# Patient Record
Sex: FEMALE | Race: WHITE | NOT HISPANIC OR LATINO | Employment: UNEMPLOYED | URBAN - METROPOLITAN AREA
[De-identification: names, ages, dates, MRNs, and addresses within clinical notes are randomized per-mention and may not be internally consistent; named-entity substitution may affect disease eponyms.]

---

## 2017-01-18 ENCOUNTER — COMMUNICATION - HEALTHEAST (OUTPATIENT)
Dept: FAMILY MEDICINE | Facility: CLINIC | Age: 52
End: 2017-01-18

## 2017-01-18 DIAGNOSIS — F32.A DEPRESSION: ICD-10-CM

## 2017-05-18 ENCOUNTER — COMMUNICATION - HEALTHEAST (OUTPATIENT)
Dept: FAMILY MEDICINE | Facility: CLINIC | Age: 52
End: 2017-05-18

## 2017-05-18 DIAGNOSIS — F32.A DEPRESSION: ICD-10-CM

## 2017-09-06 ENCOUNTER — COMMUNICATION - HEALTHEAST (OUTPATIENT)
Dept: FAMILY MEDICINE | Facility: CLINIC | Age: 52
End: 2017-09-06

## 2017-09-06 DIAGNOSIS — F32.A DEPRESSION: ICD-10-CM

## 2017-09-12 ENCOUNTER — OFFICE VISIT - HEALTHEAST (OUTPATIENT)
Dept: FAMILY MEDICINE | Facility: CLINIC | Age: 52
End: 2017-09-12

## 2017-09-12 DIAGNOSIS — F32.A DEPRESSION, UNSPECIFIED DEPRESSION TYPE: ICD-10-CM

## 2017-11-21 ENCOUNTER — COMMUNICATION - HEALTHEAST (OUTPATIENT)
Dept: FAMILY MEDICINE | Facility: CLINIC | Age: 52
End: 2017-11-21

## 2017-12-22 ENCOUNTER — COMMUNICATION - HEALTHEAST (OUTPATIENT)
Dept: TELEHEALTH | Facility: CLINIC | Age: 52
End: 2017-12-22

## 2017-12-22 ENCOUNTER — OFFICE VISIT - HEALTHEAST (OUTPATIENT)
Dept: FAMILY MEDICINE | Facility: CLINIC | Age: 52
End: 2017-12-22

## 2017-12-22 DIAGNOSIS — Z78.0 MENOPAUSE: ICD-10-CM

## 2017-12-22 DIAGNOSIS — Z13.220 SCREENING FOR HYPERLIPIDEMIA: ICD-10-CM

## 2017-12-22 DIAGNOSIS — Z12.4 ROUTINE CERVICAL SMEAR: ICD-10-CM

## 2017-12-22 DIAGNOSIS — Z12.31 VISIT FOR SCREENING MAMMOGRAM: ICD-10-CM

## 2017-12-22 DIAGNOSIS — Z13.228 SCREENING FOR METABOLIC DISORDER: ICD-10-CM

## 2017-12-22 DIAGNOSIS — Z13.0 SCREENING FOR DEFICIENCY ANEMIA: ICD-10-CM

## 2017-12-22 DIAGNOSIS — Z00.00 ROUTINE GENERAL MEDICAL EXAMINATION AT A HEALTH CARE FACILITY: ICD-10-CM

## 2017-12-22 LAB
CHOLEST SERPL-MCNC: 183 MG/DL
FASTING STATUS PATIENT QL REPORTED: YES
HDLC SERPL-MCNC: 70 MG/DL
LDLC SERPL CALC-MCNC: 102 MG/DL
TRIGL SERPL-MCNC: 55 MG/DL

## 2017-12-27 LAB
HUMAN PAPILLOMA VIRUS 16 DNA: NEGATIVE
HUMAN PAPILLOMA VIRUS 18 DNA: NEGATIVE
HUMAN PAPILLOMA VIRUS FINAL DIAGNOSIS: NORMAL
HUMAN PAPILLOMA VIRUS OTHER HR: NEGATIVE
SPECIMEN DESCRIPTION: NORMAL

## 2018-01-02 LAB
BKR LAB AP ABNORMAL BLEEDING: NO
BKR LAB AP BIRTH CONTROL/HORMONES: NORMAL
BKR LAB AP CERVICAL APPEARANCE: NORMAL
BKR LAB AP GYN ADEQUACY: NORMAL
BKR LAB AP GYN INTERPRETATION: NORMAL
BKR LAB AP HPV REFLEX: NORMAL
BKR LAB AP LMP: NORMAL
BKR LAB AP PATIENT STATUS: NORMAL
BKR LAB AP PREVIOUS ABNORMAL: NO
BKR LAB AP PREVIOUS NORMAL: 2013
HIGH RISK?: NO
PATH REPORT.COMMENTS IMP SPEC: NORMAL
RESULT FLAG (HE HISTORICAL CONVERSION): NORMAL

## 2018-01-03 ENCOUNTER — RECORDS - HEALTHEAST (OUTPATIENT)
Dept: BONE DENSITY | Facility: CLINIC | Age: 53
End: 2018-01-03

## 2018-01-03 ENCOUNTER — RECORDS - HEALTHEAST (OUTPATIENT)
Dept: ADMINISTRATIVE | Facility: OTHER | Age: 53
End: 2018-01-03

## 2018-01-03 ENCOUNTER — HOSPITAL ENCOUNTER (OUTPATIENT)
Dept: MAMMOGRAPHY | Facility: HOSPITAL | Age: 53
Discharge: HOME | End: 2018-01-03
Attending: FAMILY MEDICINE

## 2018-01-03 ENCOUNTER — COMMUNICATION - HEALTHEAST (OUTPATIENT)
Dept: FAMILY MEDICINE | Facility: CLINIC | Age: 53
End: 2018-01-03

## 2018-01-03 ENCOUNTER — COMMUNICATION - HEALTHEAST (OUTPATIENT)
Dept: TELEHEALTH | Facility: CLINIC | Age: 53
End: 2018-01-03

## 2018-01-03 DIAGNOSIS — Z12.31 VISIT FOR SCREENING MAMMOGRAM: ICD-10-CM

## 2018-01-03 DIAGNOSIS — Z78.0 ASYMPTOMATIC MENOPAUSAL STATE: ICD-10-CM

## 2018-01-05 ENCOUNTER — COMMUNICATION - HEALTHEAST (OUTPATIENT)
Dept: FAMILY MEDICINE | Facility: CLINIC | Age: 53
End: 2018-01-05

## 2018-01-05 ENCOUNTER — AMBULATORY - HEALTHEAST (OUTPATIENT)
Dept: FAMILY MEDICINE | Facility: CLINIC | Age: 53
End: 2018-01-05

## 2018-01-10 ENCOUNTER — COMMUNICATION - HEALTHEAST (OUTPATIENT)
Dept: FAMILY MEDICINE | Facility: CLINIC | Age: 53
End: 2018-01-10

## 2018-04-13 ENCOUNTER — RECORDS - HEALTHEAST (OUTPATIENT)
Dept: ADMINISTRATIVE | Facility: OTHER | Age: 53
End: 2018-04-13

## 2018-10-16 ENCOUNTER — COMMUNICATION - HEALTHEAST (OUTPATIENT)
Dept: FAMILY MEDICINE | Facility: CLINIC | Age: 53
End: 2018-10-16

## 2018-11-08 ENCOUNTER — COMMUNICATION - HEALTHEAST (OUTPATIENT)
Dept: FAMILY MEDICINE | Facility: CLINIC | Age: 53
End: 2018-11-08

## 2018-11-08 DIAGNOSIS — F32.A DEPRESSION, UNSPECIFIED DEPRESSION TYPE: ICD-10-CM

## 2018-11-29 ENCOUNTER — OFFICE VISIT - HEALTHEAST (OUTPATIENT)
Dept: FAMILY MEDICINE | Facility: CLINIC | Age: 53
End: 2018-11-29

## 2018-11-29 DIAGNOSIS — M72.0 DUPUYTREN'S CONTRACTURE OF LEFT HAND: ICD-10-CM

## 2018-11-29 DIAGNOSIS — F33.41 RECURRENT MAJOR DEPRESSIVE DISORDER, IN PARTIAL REMISSION (H): ICD-10-CM

## 2018-11-29 DIAGNOSIS — S46.212A BICEPS MUSCLE STRAIN, LEFT, INITIAL ENCOUNTER: ICD-10-CM

## 2019-01-08 ENCOUNTER — OFFICE VISIT - HEALTHEAST (OUTPATIENT)
Dept: FAMILY MEDICINE | Facility: CLINIC | Age: 54
End: 2019-01-08

## 2019-01-08 DIAGNOSIS — Z13.220 SCREENING FOR CHOLESTEROL LEVEL: ICD-10-CM

## 2019-01-08 DIAGNOSIS — Z13.0 SCREENING FOR DEFICIENCY ANEMIA: ICD-10-CM

## 2019-01-08 DIAGNOSIS — M25.562 CHRONIC PAIN OF LEFT KNEE: ICD-10-CM

## 2019-01-08 DIAGNOSIS — Z13.29 SCREENING FOR THYROID DISORDER: ICD-10-CM

## 2019-01-08 DIAGNOSIS — Z00.00 ROUTINE GENERAL MEDICAL EXAMINATION AT A HEALTH CARE FACILITY: ICD-10-CM

## 2019-01-08 DIAGNOSIS — Z11.59 ENCOUNTER FOR HEPATITIS C SCREENING TEST FOR LOW RISK PATIENT: ICD-10-CM

## 2019-01-08 DIAGNOSIS — R07.0 THROAT DISCOMFORT: ICD-10-CM

## 2019-01-08 DIAGNOSIS — Z13.88 SCREENING FOR LEAD EXPOSURE: ICD-10-CM

## 2019-01-08 DIAGNOSIS — G89.29 CHRONIC PAIN OF LEFT KNEE: ICD-10-CM

## 2019-01-08 DIAGNOSIS — Z13.1 SCREENING FOR DIABETES MELLITUS: ICD-10-CM

## 2019-01-08 DIAGNOSIS — Z13.21 ENCOUNTER FOR VITAMIN DEFICIENCY SCREENING: ICD-10-CM

## 2019-01-08 LAB
ANION GAP SERPL CALCULATED.3IONS-SCNC: 12 MMOL/L (ref 5–18)
BUN SERPL-MCNC: 14 MG/DL (ref 8–22)
CALCIUM SERPL-MCNC: 9.4 MG/DL (ref 8.5–10.5)
CHLORIDE BLD-SCNC: 104 MMOL/L (ref 98–107)
CHOLEST SERPL-MCNC: 170 MG/DL
CO2 SERPL-SCNC: 24 MMOL/L (ref 22–31)
CREAT SERPL-MCNC: 0.75 MG/DL (ref 0.6–1.1)
ERYTHROCYTE [DISTWIDTH] IN BLOOD BY AUTOMATED COUNT: 11.1 % (ref 11–14.5)
FASTING STATUS PATIENT QL REPORTED: NORMAL
GFR SERPL CREATININE-BSD FRML MDRD: >60 ML/MIN/1.73M2
GLUCOSE BLD-MCNC: 83 MG/DL (ref 70–125)
HCT VFR BLD AUTO: 38.8 % (ref 35–47)
HDLC SERPL-MCNC: 72 MG/DL
HGB BLD-MCNC: 12.9 G/DL (ref 12–16)
LDLC SERPL CALC-MCNC: 83 MG/DL
MCH RBC QN AUTO: 29.9 PG (ref 27–34)
MCHC RBC AUTO-ENTMCNC: 33.3 G/DL (ref 32–36)
MCV RBC AUTO: 90 FL (ref 80–100)
PLATELET # BLD AUTO: 147 THOU/UL (ref 140–440)
PMV BLD AUTO: 7.6 FL (ref 7–10)
POTASSIUM BLD-SCNC: 3.9 MMOL/L (ref 3.5–5)
RBC # BLD AUTO: 4.33 MILL/UL (ref 3.8–5.4)
SODIUM SERPL-SCNC: 140 MMOL/L (ref 136–145)
TRIGL SERPL-MCNC: 74 MG/DL
TSH SERPL DL<=0.005 MIU/L-ACNC: 1.78 UIU/ML (ref 0.3–5)
WBC: 5.5 THOU/UL (ref 4–11)

## 2019-01-08 ASSESSMENT — MIFFLIN-ST. JEOR: SCORE: 1186.89

## 2019-01-09 LAB
25(OH)D3 SERPL-MCNC: 17.2 NG/ML (ref 30–80)
COLLECTION METHOD: NORMAL
HCV AB SERPL QL IA: NEGATIVE
LEAD BLD-MCNC: NORMAL UG/DL
LEAD RETEST: NO

## 2019-01-11 LAB — LEAD BLDV-MCNC: <2 UG/DL (ref 0–4.9)

## 2019-01-14 ENCOUNTER — COMMUNICATION - HEALTHEAST (OUTPATIENT)
Dept: FAMILY MEDICINE | Facility: CLINIC | Age: 54
End: 2019-01-14

## 2019-03-21 ENCOUNTER — RECORDS - HEALTHEAST (OUTPATIENT)
Dept: MAMMOGRAPHY | Facility: CLINIC | Age: 54
End: 2019-03-21

## 2019-03-21 DIAGNOSIS — Z12.31 ENCOUNTER FOR SCREENING MAMMOGRAM FOR MALIGNANT NEOPLASM OF BREAST: ICD-10-CM

## 2019-05-17 ENCOUNTER — OFFICE VISIT - HEALTHEAST (OUTPATIENT)
Dept: INTERNAL MEDICINE | Facility: CLINIC | Age: 54
End: 2019-05-17

## 2019-05-17 DIAGNOSIS — H60.11 CELLULITIS OF RIGHT EARLOBE: ICD-10-CM

## 2019-05-17 RX ORDER — LORATADINE 10 MG/1
10 TABLET ORAL PRN
Status: SHIPPED | COMMUNITY
Start: 2019-05-17

## 2019-05-17 ASSESSMENT — MIFFLIN-ST. JEOR: SCORE: 1214.11

## 2019-12-11 ENCOUNTER — COMMUNICATION - HEALTHEAST (OUTPATIENT)
Dept: FAMILY MEDICINE | Facility: CLINIC | Age: 54
End: 2019-12-11

## 2019-12-11 DIAGNOSIS — F33.41 RECURRENT MAJOR DEPRESSIVE DISORDER, IN PARTIAL REMISSION (H): ICD-10-CM

## 2020-03-14 ENCOUNTER — COMMUNICATION - HEALTHEAST (OUTPATIENT)
Dept: FAMILY MEDICINE | Facility: CLINIC | Age: 55
End: 2020-03-14

## 2020-03-14 DIAGNOSIS — F33.41 RECURRENT MAJOR DEPRESSIVE DISORDER, IN PARTIAL REMISSION (H): ICD-10-CM

## 2020-03-25 ENCOUNTER — OFFICE VISIT - HEALTHEAST (OUTPATIENT)
Dept: FAMILY MEDICINE | Facility: CLINIC | Age: 55
End: 2020-03-25

## 2020-03-25 DIAGNOSIS — F33.41 RECURRENT MAJOR DEPRESSIVE DISORDER, IN PARTIAL REMISSION (H): ICD-10-CM

## 2020-03-25 DIAGNOSIS — Z12.31 VISIT FOR SCREENING MAMMOGRAM: ICD-10-CM

## 2020-03-25 ASSESSMENT — ANXIETY QUESTIONNAIRES
2. NOT BEING ABLE TO STOP OR CONTROL WORRYING: SEVERAL DAYS
1. FEELING NERVOUS, ANXIOUS, OR ON EDGE: SEVERAL DAYS
6. BECOMING EASILY ANNOYED OR IRRITABLE: SEVERAL DAYS
5. BEING SO RESTLESS THAT IT IS HARD TO SIT STILL: NOT AT ALL
3. WORRYING TOO MUCH ABOUT DIFFERENT THINGS: NOT AT ALL
4. TROUBLE RELAXING: NOT AT ALL
GAD7 TOTAL SCORE: 3
7. FEELING AFRAID AS IF SOMETHING AWFUL MIGHT HAPPEN: NOT AT ALL
IF YOU CHECKED OFF ANY PROBLEMS ON THIS QUESTIONNAIRE, HOW DIFFICULT HAVE THESE PROBLEMS MADE IT FOR YOU TO DO YOUR WORK, TAKE CARE OF THINGS AT HOME, OR GET ALONG WITH OTHER PEOPLE: SOMEWHAT DIFFICULT

## 2020-03-25 ASSESSMENT — PATIENT HEALTH QUESTIONNAIRE - PHQ9: SUM OF ALL RESPONSES TO PHQ QUESTIONS 1-9: 6

## 2020-07-28 ENCOUNTER — HOSPITAL ENCOUNTER (OUTPATIENT)
Dept: MAMMOGRAPHY | Facility: CLINIC | Age: 55
Discharge: HOME | End: 2020-07-28
Attending: NURSE PRACTITIONER

## 2020-07-28 DIAGNOSIS — Z12.31 VISIT FOR SCREENING MAMMOGRAM: ICD-10-CM

## 2020-09-24 ENCOUNTER — OFFICE VISIT - HEALTHEAST (OUTPATIENT)
Dept: FAMILY MEDICINE | Facility: CLINIC | Age: 55
End: 2020-09-24

## 2020-09-24 DIAGNOSIS — H61.21 IMPACTED CERUMEN OF RIGHT EAR: ICD-10-CM

## 2020-09-24 DIAGNOSIS — F33.41 RECURRENT MAJOR DEPRESSIVE DISORDER, IN PARTIAL REMISSION (H): ICD-10-CM

## 2020-09-24 DIAGNOSIS — Z23 NEED FOR IMMUNIZATION AGAINST INFLUENZA: ICD-10-CM

## 2020-09-24 ASSESSMENT — MIFFLIN-ST. JEOR: SCORE: 1199.36

## 2020-09-24 ASSESSMENT — PATIENT HEALTH QUESTIONNAIRE - PHQ9: SUM OF ALL RESPONSES TO PHQ QUESTIONS 1-9: 9

## 2020-10-20 ENCOUNTER — RECORDS - HEALTHEAST (OUTPATIENT)
Dept: ADMINISTRATIVE | Facility: OTHER | Age: 55
End: 2020-10-20

## 2021-03-01 ENCOUNTER — COMMUNICATION - HEALTHEAST (OUTPATIENT)
Dept: INTERNAL MEDICINE | Facility: CLINIC | Age: 56
End: 2021-03-01

## 2021-03-01 DIAGNOSIS — F33.41 RECURRENT MAJOR DEPRESSIVE DISORDER, IN PARTIAL REMISSION (H): ICD-10-CM

## 2021-03-09 ENCOUNTER — COMMUNICATION - HEALTHEAST (OUTPATIENT)
Dept: INTERNAL MEDICINE | Facility: CLINIC | Age: 56
End: 2021-03-09

## 2021-03-09 DIAGNOSIS — Z00.00 ROUTINE HEALTH MAINTENANCE: ICD-10-CM

## 2021-03-10 ENCOUNTER — AMBULATORY - HEALTHEAST (OUTPATIENT)
Dept: NURSING | Facility: CLINIC | Age: 56
End: 2021-03-10

## 2021-03-10 DIAGNOSIS — Z00.00 ROUTINE HEALTH MAINTENANCE: ICD-10-CM

## 2021-05-25 ENCOUNTER — RECORDS - HEALTHEAST (OUTPATIENT)
Dept: ADMINISTRATIVE | Facility: CLINIC | Age: 56
End: 2021-05-25

## 2021-05-27 ASSESSMENT — PATIENT HEALTH QUESTIONNAIRE - PHQ9
SUM OF ALL RESPONSES TO PHQ QUESTIONS 1-9: 6
SUM OF ALL RESPONSES TO PHQ QUESTIONS 1-9: 9

## 2021-05-28 ENCOUNTER — RECORDS - HEALTHEAST (OUTPATIENT)
Dept: ADMINISTRATIVE | Facility: CLINIC | Age: 56
End: 2021-05-28

## 2021-05-28 ASSESSMENT — ANXIETY QUESTIONNAIRES: GAD7 TOTAL SCORE: 3

## 2021-05-28 NOTE — PROGRESS NOTES
"  Office Visit - Follow Up   Mame Phan   54 y.o. female    Date of Visit: 5/17/2019    Chief Complaint   Patient presents with     Ear Pain     Right ear lobe infected where pierced earring goes, sharp shooting pain behind ear, started ~4 days ago        Assessment and Plan   1. Cellulitis of right earlobe  She has had similar problems with this when she tries to put in earrings with a bit of difficulty.  Subsequently she develops a bit of infection.  The redness and slight tenderness of her earlobe is minimal at this time.  She clearly thinks it is improving.  Will not use antibiotics now but could call if it is worse and we will start something.          No follow-ups on file.     History of Present Illness   This 54 y.o. old comes in complaining that she has had a swollen and tender right earlobe.  Beginning 6 days ago after she had some difficulty getting an earring in developed some redness and swelling in her right earlobe.  It progressed to where 4 days ago it was quite hard and tender.  Seem to turn the corner and over the last few days is gotten substantially better.  She denies any fevers or chills.  She has been soaking her right earlobe Epson salts and a shot glass.  I told her this is quite created.    Review of Systems: A comprehensive review of systems was negative except as noted.     Medications, Allergies and Problem List   Reviewed, reconciled and updated     Physical Exam   General Appearance:       /66 (Patient Site: Right Arm, Patient Position: Sitting, Cuff Size: Adult Large)   Pulse 64   Ht 5' 6\" (1.676 m)   Wt 135 lb (61.2 kg)   SpO2 99%   BMI 21.79 kg/m      She has very mild redness in the right earlobe.  There is no palpable abscess.  Minimal tenderness.  No significant warmth.  There is no preauricular nor postauricular adenopathy.  No cervical chain adenopathy.     Additional Information   Current Outpatient Medications   Medication Sig Dispense Refill     loratadine " (CLARITIN) 10 mg tablet Take 10 mg by mouth as needed for allergies.       sertraline (ZOLOFT) 100 MG tablet TAKE 2 TABLETS(200 MG) BY MOUTH DAILY. 180 tablet 3     No current facility-administered medications for this visit.      Allergies   Allergen Reactions     Monistat 1 (Tioconazole) [Tioconazole]      Social History     Tobacco Use     Smoking status: Never Smoker     Smokeless tobacco: Never Used   Substance Use Topics     Alcohol use: Yes     Comment: 2/ week     Drug use: No       Review and/or order of clinical lab tests:  Review and/or order of radiology tests:  Review and/or order of medicine tests:  Discussion of test results with performing physician:  Decision to obtain old records and/or obtain history from someone other than the patient:  Review and summarization of old records and/or obtaining history from someone other than the patient and.or discussion of case with another health care provider:  Independent visualization of image, tracing or specimen itself:    Time: total time spent with the patient was 15 minutes of which >50% was spent in counseling and coordination of care     Syed Corbett MD

## 2021-05-29 ENCOUNTER — RECORDS - HEALTHEAST (OUTPATIENT)
Dept: ADMINISTRATIVE | Facility: CLINIC | Age: 56
End: 2021-05-29

## 2021-05-31 VITALS — WEIGHT: 124 LBS

## 2021-06-01 ENCOUNTER — RECORDS - HEALTHEAST (OUTPATIENT)
Dept: ADMINISTRATIVE | Facility: CLINIC | Age: 56
End: 2021-06-01

## 2021-06-02 VITALS — BODY MASS INDEX: 20.73 KG/M2 | HEIGHT: 66 IN | WEIGHT: 129 LBS

## 2021-06-02 VITALS — WEIGHT: 128.25 LBS

## 2021-06-03 VITALS — HEIGHT: 66 IN | BODY MASS INDEX: 21.69 KG/M2 | WEIGHT: 135 LBS

## 2021-06-04 NOTE — TELEPHONE ENCOUNTER
Refill Approved    Rx renewed per Medication Renewal Policy. Medication was last renewed on 11/29/*18.    Yenny Lara, [facility] Triage/Med Refill 12/14/2019     Requested Prescriptions   Pending Prescriptions Disp Refills     sertraline (ZOLOFT) 100 MG tablet [Pharmacy Med Name: SERTRALINE 100MG TABLETS] 180 tablet 0     Sig: TAKE 2 TABLETS(200 MG) BY MOUTH DAILY       SSRI Refill Protocol  Passed - 12/11/2019  5:15 PM        Passed - PCP or prescribing provider visit in last year     Last office visit with prescriber/PCP: 11/29/2018 Huong Dacosta CNP OR same dept: Visit date not found OR same specialty: 11/29/2018 Huong Dacosta CNP  Last physical: 1/8/2019 Last MTM visit: Visit date not found   Next visit within 3 mo: Visit date not found  Next physical within 3 mo: Visit date not found  Prescriber OR PCP: Huong Dacosta CNP  Last diagnosis associated with med order: 1. Recurrent major depressive disorder, in partial remission (H)  - sertraline (ZOLOFT) 100 MG tablet [Pharmacy Med Name: SERTRALINE 100MG TABLETS]; TAKE 2 TABLETS(200 MG) BY MOUTH DAILY.  Dispense: 180 tablet; Refill: 0    If protocol passes may refill for 12 months if within 3 months of last provider visit (or a total of 15 months).

## 2021-06-05 VITALS
WEIGHT: 131.75 LBS | RESPIRATION RATE: 18 BRPM | OXYGEN SATURATION: 97 % | HEART RATE: 65 BPM | HEIGHT: 66 IN | TEMPERATURE: 98.1 F | BODY MASS INDEX: 21.17 KG/M2 | DIASTOLIC BLOOD PRESSURE: 60 MMHG | SYSTOLIC BLOOD PRESSURE: 98 MMHG

## 2021-06-06 NOTE — TELEPHONE ENCOUNTER
I have refilled this. Please let her know she is due for med check but we can do a phone visit for this to avoid any contact in clinic

## 2021-06-07 NOTE — PROGRESS NOTES
"Mame Phan is a 55 y.o. female who is being evaluated via a billable telephone visit.      The patient has been notified of following:     \"This telephone visit will be conducted via a call between you and your physician/provider. We have found that certain health care needs can be provided without the need for a physical exam.  This service lets us provide the care you need with a short phone conversation.  If a prescription is necessary we can send it directly to your pharmacy.  If lab work is needed we can place an order for that and you can then stop by our lab to have the test done at a later time.    If during the course of the call the physician/provider feels a telephone visit is not appropriate, you will not be charged for this service.\"     Mame Phan calls today for medication check.   Daughters are in college and senior in high school. They have been enjoying time together at home baking and talking and just spending time.  G[place] together with neighbors [place] afternoon to talk, standing at least six feet apart of course.     She has a history of depression that has previously been well controlled on sertraline 150mg-200mg.  She has been on this medication for almost 20 years. Currently doing 150mg. Does bump up to 200mg when depression worsens.  She feels she has been coping relatively well with everything.  She still lives with her .  She is still hoping to get  in the near future but must secure a job before she can do that which has been difficult with the current economic environment.  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  Her  is working from home right now which has been difficult because they are forced to spend so much more time together.  She is still following with her therapist regularly. Walking 1-2 hours daily.  She believes depression has been relatively well controlled.      HM:  Due for " mammogram.  [facility] in [place] at age 52.     Chief Complaint   Patient presents with     Depression       I have reviewed and updated the patient's Past Medical History, Social History, Family History and Medication List.    ALLERGIES  Monistat 1 (tioconazole) [tioconazole]    Additional provider notes: none    Assessment/Plan:  1. Recurrent major depressive disorder, in partial remission (H)  Some recent stress with estranged  now working from home.  Continues to try to secure a job which has also been stressful.  However, overall she believes she is coping well and has been focusing on exercise and spending time with her girls.  Currently on sertraline 150mg though does increase to 200mg during flares of depression.  She feels this continues to work well for her.  Following regularly with therapist. Will plan for physical and med check in one year.  Sooner if depression worsens.   - sertraline (ZOLOFT) 100 MG tablet; Two tablets by mouth daily  Dispense: 180 tablet; Refill: 3    2. Visit for screening mammogram  Continue with annual mammograms at least until age 64 d/t [facility] in mother.  Advised to schedule this in July.    - Mammo Screening Bilateral; Future    Phone call duration: 11 minutes    Huong Dacosta, ELZA

## 2021-06-10 ENCOUNTER — COMMUNICATION - HEALTHEAST (OUTPATIENT)
Dept: FAMILY MEDICINE | Facility: CLINIC | Age: 56
End: 2021-06-10

## 2021-06-10 DIAGNOSIS — F33.41 RECURRENT MAJOR DEPRESSIVE DISORDER, IN PARTIAL REMISSION (H): ICD-10-CM

## 2021-06-13 RX ORDER — SERTRALINE HYDROCHLORIDE 100 MG/1
TABLET, FILM COATED ORAL
Qty: 180 TABLET | Refills: 0 | Status: SHIPPED | OUTPATIENT
Start: 2021-06-13 | End: 2021-09-08

## 2021-06-14 NOTE — PROGRESS NOTES
FEMALE ADULT PREVENTIVE EXAM    CHIEF COMPLAINT:  Female preventive exam.    SUBJECTIVE:  Mame Phan is a 52 y.o. female who presents for her routine physical exam.    Patient would like to address the following concerns today: She has no immediate one new concern she is feeling very well.      GYNE HISTORY  Menses: yes  Sexually Active:   yes  Contraception:   yes  Last Pap:  12/13 DUE FOR THIS   Abnormal Pap:  no  Vaginal Discharge:  no      She  has no past medical history on file.    Lab Results   Component Value Date    HGB 14.6 12/20/2013     12/20/2013    K 4.4 12/20/2013    BUN 16 12/20/2013     Lab Results   Component Value Date    CHOL 160 01/03/2012    HDL 56 01/03/2012    LDLCALC 92 01/03/2012    TRIG 60 01/03/2012     No results found for: TSH  BP Readings from Last 3 Encounters:   12/22/17 112/62   04/14/15 102/54       Surgeries:  No past surgical history on file.    Family History:  Her family history includes Breast cancer (age of onset: 53) in her mother; Cancer in her cousin, father, maternal grandmother, and paternal grandmother; No Medical Problems in her daughter, maternal aunt, maternal grandfather, paternal aunt, paternal grandfather, and sister. There is no history of BRCA 1/2, Colon cancer, Endometrial cancer, or Ovarian cancer.    Social History:  She  reports that she has never smoked. She has never used smokeless tobacco. She reports that she drinks alcohol. She reports that she does not use illicit drugs.    Medications:    Current Outpatient Prescriptions:      sertraline (ZOLOFT) 100 MG tablet, TAKE 2 TABLETS(200 MG) BY MOUTH DAILY, Disp: 180 tablet, Rfl: 3  HELD MEDICATIONS: None.    Allergies:  No latex allergies.  Allergies   Allergen Reactions     Monistat 1 (Tioconazole) [Tioconazole]             RISK BEHAVIOR & HEALTH HABITS  Self Breast Exam:  yes  Regular Exercise:  yes  Balanced diet:  yes  Seat Belt Use: yes  Calcium intake/Osteoporosis prevention:  Discussed  calcium and Vitamin D recommendations  Dexa:  no  Colonoscopy: 4/14  Mammogram:  4/15 DUE FOR THIS     Guns: NO  Sun Screen: YES  Dental Care: YES    REVIEW OF SYSTEMS:  Complete head to toe review of systems is otherwise negative except as above.    OBJECTIVE:  VITAL SIGNS:    Vitals:    12/22/17 1057   BP: 112/62     GENERAL:  Patient alert, in no acute distress.  EYES: PERRLA. Extraoccular movements intact, pupils equal, reactive to light and accommodation.  ENT:  Hearing grossly normal.  Normal appearance to ears and nose.  Bilateral TM s, external canals, oropharynx normal. Normal lips, gums and teeth.  Normal nasal mucosa, septum and turbinates.  NECK:  Supple, without thyromegaly or mass.  RESP:  Clear to auscultation without crackles, wheezes or distress.  Normal respiratory effort.   CV:  Regular rate and rhythm without murmurs, rubs or gallops.  Normal carotid, abdominal aorta, femoral and pedal pulses.  No varicosities or edema.  ABDOMEN:  Soft, non-tender, without hepatosplenomegaly, masses, or hernias.   BREASTS:  Nontender, without masses, nipple discharge, erythema, or axillary adenopathy.    :    External genitalia:  Normal without lesions.  Urethra: Normal appearing  Vagina: Normal without discharge  Cervix: Speculum exam - cervix visually normal.Vagina normal.Sample taken for Pap smear.Bimanual exam  - cervix palpably normal. No tenderness on cervical motion.   Uterus:  Nontender, mobile, without masses  Ovaries: Normal without masses  LYMPHATIC: Normal palpation of neck, groin and axilla..  No lymphadenopathy.  No bruising.  NEURO:  Non-focal and intact.  PSYCHIATRIC:  Alert & oriented with normal mood and affect.  Good judgment and insight.  SKIN:  Normal inspection and palpation.  MUSCULOSKELETAL: Normal gait and station.      ASSESSMENT & PLAN  Mame was seen today for annual exam.    Diagnoses and all orders for this visit:    Screening for deficiency anemia  -     HM1(CBC and  Differential)  -     HM1 (CBC with Diff)    Screening for hyperlipidemia  -     Lipid Cascade    Screening for metabolic disorder  -     Comprehensive Metabolic Panel    Visit for screening mammogram  -     Mammo Screening Bilateral; Future    Routine cervical smear  -     Gynecologic Cytology (PAP Smear)    Menopause  -     DXA Bone Density Scan; Future    Other orders  -     Td, Preservative Free (green label)      General  Immunizations reviewed and updated .  Recommended adequate calcium intake/osteoporosis prevention.  Discussed colon cancer screening at age 50, 45 if -American.  Diet and exercise reviewed, including goal of aerobic exercise 30-90 minutes most days

## 2021-06-15 NOTE — TELEPHONE ENCOUNTER
Refill Approved    Rx renewed per Medication Renewal Policy. Medication was last renewed on 03/25/2020.  Last office visit was 09/24/2020 with PCP.    Kimberlee Barber, [facility] Triage/Med Refill 3/2/2021     Requested Prescriptions   Pending Prescriptions Disp Refills     sertraline (ZOLOFT) 100 MG tablet 180 tablet 3     Sig: Two tablets by mouth daily       SSRI Refill Protocol  Passed - 3/1/2021  2:20 PM        Passed - PCP or prescribing provider visit in last year     Last office visit with prescriber/PCP: 9/24/2020 Huong Dacosta CNP OR same dept: Visit date not found OR same specialty: 5/17/2019 Syed Corbett MD  Last physical: 1/8/2019 Last MTM visit: Visit date not found   Next visit within 3 mo: Visit date not found  Next physical within 3 mo: Visit date not found  Prescriber OR PCP: Huong Dacosta CNP  Last diagnosis associated with med order: 1. Recurrent major depressive disorder, in partial remission (H)  - sertraline (ZOLOFT) 100 MG tablet; Two tablets by mouth daily  Dispense: 180 tablet; Refill: 3    If protocol passes may refill for 12 months if within 3 months of last provider visit (or a total of 15 months).

## 2021-06-15 NOTE — TELEPHONE ENCOUNTER
Reason for Call:  Other call back      Detailed comments: PATIENT CALLING STATING HAS A NEW PHARMACY:  SERTRALINE 100MG 2 TABS DAILY REQUESTING A 90 DAY SUPPLY    [employer] -- [phone] OPTION 2        Phone Number Patient can be reached at: Cell number on file:    Telephone Information:   Mobile [phone]       Best Time: ANYTIME    Can we leave a detailed message on this number?: Yes    Call taken on 3/1/2021 at 11:32 AM by Aisha Villanueva

## 2021-06-18 NOTE — LETTER
Letter by Huong Dacosta CNP at      Author: Huong Dacosta CNP Service: -- Author Type: --    Filed:  Encounter Date: 1/14/2019 Status: (Other)       Mame Phan  [address] [facility] [address]             January 14, 2019         Dear Ms. Phan,    Below are the results from your recent visit:    Resulted Orders   Lipid Cascade   Result Value Ref Range    Cholesterol 170 <=199 mg/dL    Triglycerides 74 <=149 mg/dL    HDL Cholesterol 72 >=50 mg/dL    LDL Calculated 83 <=129 mg/dL    Patient Fasting > 8hrs? Unknown    Basic Metabolic Panel   Result Value Ref Range    Sodium 140 136 - 145 mmol/L    Potassium 3.9 3.5 - 5.0 mmol/L    Chloride 104 98 - 107 mmol/L    CO2 24 22 - 31 mmol/L    Anion Gap, Calculation 12 5 - 18 mmol/L    Glucose 83 70 - 125 mg/dL    Calcium 9.4 8.5 - 10.5 mg/dL    BUN 14 8 - 22 mg/dL    Creatinine 0.75 0.60 - 1.10 mg/dL    GFR MDRD Af Amer >60 >60 mL/min/1.73m2    GFR MDRD Non Af Amer >60 >60 mL/min/1.73m2    Narrative    Fasting Glucose reference range is 70-99 mg/dL per  American Diabetes Association (ADA) guidelines.   Hepatitis C Antibody (Anti-HCV)   Result Value Ref Range    Hepatitis C Ab Negative Negative   HM2(CBC w/o Differential)   Result Value Ref Range    WBC 5.5 4.0 - 11.0 thou/uL    RBC 4.33 3.80 - 5.40 mill/uL    Hemoglobin 12.9 12.0 - 16.0 g/dL    Hematocrit 38.8 35.0 - 47.0 %    MCV 90 80 - 100 fL    MCH 29.9 27.0 - 34.0 pg    MCHC 33.3 32.0 - 36.0 g/dL    RDW 11.1 11.0 - 14.5 %    Platelets 147 140 - 440 thou/uL    MPV 7.6 7.0 - 10.0 fL   Vitamin D, Total (25-Hydroxy)   Result Value Ref Range    Vitamin D, Total (25-Hydroxy) 17.2 (L) 30.0 - 80.0 ng/mL    Narrative    Deficiency <10.0 ng/mL  Insufficiency 10.0-29.9 ng/mL  Sufficiency 30.0-80.0 ng/mL  Toxicity (possible) >100.0 ng/mL   Thyroid Cascade   Result Value Ref Range    TSH 1.78 0.30 - 5.00 uIU/mL   Lead, Blood, Venouos   Result Value Ref Range    Lead, Blood (Venous) <2.0 0.0 - 4.9 ug/dL      Comment:      " INTERPRETIVE INFORMATION: Lead, Blood (Venous)    Elevated results may be due to skin or collection-related   contamination, including the use of a noncertified lead-free tube.   If contamination concerns exist due to elevated levels of blood   lead, confirmation with a second specimen collected in a certified   lead-free tube is recommended.    Information sources for reference intervals and interpretive   comments include the \"CDC Response to the 2012 Advisory Committee   on Childhood Lead Poisoning Prevention Report\" and the   \"Recommendations for Medical Management of Adult Lead Exposure,   Environmental Health Perspectives, 2007.\" Thresholds and time   intervals for retesting, medical evaluation, and response vary by   state and regulatory body. Contact your State Department of Health   and/or applicable regulatory agency for specific guidance on   medical management recommendations.         Age            Concentration   Comment    All ages       5-9.9 ug/dL     Adverse health   effects are                                  possible, particularly in                                 children under 6 years of                                 age and pregnant women.                                 Discuss health risks                                 associated with continued                                 lead exposure. For children                                 and women who are or may                                 become pregnant, reduce                                 lead exposure.                 All ages        10-19.9 ug/dL  Reduced lead exposure and                                 increased biological                                 monitoring are recommended.    All ages        20-69.9 ug/dL  Removal from lead exposure                                 and prompt medical                                 evaluation are recommended.                                 Consider chelation therapy                 "                 when concentrations exceed                                   50 ug/dL and symptoms of                                 lead toxicity are present.    Less than 19     Greater than  Critical. Immediate medical  years of age     44.9 ug/dL    evaluation is recommended.                                 Consider chelation therapy                                  when symptoms of lead                                 toxicity are present.    Greater than 19  Greater than  Critical. Immediate medical  years of age     69.9 ug/dL    evaluation is recommended                                 Consider chelation therapy                                 when symptoms of lead                                  toxicity are present.    Test developed and characteristics determined by [facility]. See Compliance Statement B: [employer]  Performed by [facility],  [address] [place] [id number] [phone]  [email or web address], True Merchant MD, Lab. Director        All of your labs were normal except for your vitamin D which was low.  I recommend either starting a  supplement of 2000IU daily or increasing your current supplementation by 2000IU.     Please call with questions or contact us using [employer].    Sincerely,        Electronically signed by Houng Dacosta CNP

## 2021-06-20 NOTE — LETTER
Letter by Huong Dacosta CNP at      Author: Huong Dacosta CNP Service: -- Author Type: --    Filed:  Encounter Date: 3/25/2020 Status: (Other)                    My Depression Action Plan  Name: Mame Phan   Date of Birth 1965  Date: 3/25/2020    My Doctor: Huong Dacosta CNP   My Clinic: [facility]  [address] [address]  [phone]          GREEN    ZONE   Good Control    What it looks like:     Things are going generally well. You have normal ups and downs. You may even feel depressed from time to time, but bad moods usually last less than a day.   What you need to do:  1. Continue to care for yourself (see self care plan)  2. Check your depression survival kit and update it as needed  3. Follow your physicians recommendations including any medication.  4. Do not stop taking medication unless you consult with your physician first.           YELLOW         ZONE Getting Worse    What it looks like:     Depression is starting to interfere with your life.     It may be hard to get out of bed; you may be starting to isolate yourself from others.    Symptoms of depression are starting to last most all day and this has happened for several days.     You may have suicidal thoughts but they are not constant.   What you need to do:     1. Call your care team. Your response to treatment will improve if you keep your care team informed of your progress. Yellow periods are signs an adjustment may need to be made.     2. Continue your self-care.  Just get dressed and ready for the day.  Don't give yourself time to talk yourself out of it.    3. Talk to someone in your support network.    4. Open up your depression Depression Self-Care Plan / Wellness kit.           RED    ZONE Medical Alert - Get Help    What it looks like:     Depression is seriously interfering with your life.     You may experience these or other symptoms: You cant get out of bed most days, cant work or  engage in other necessary activities, you have trouble taking care of basic hygiene, or basic responsibilities, thoughts of suicide or death that will not go away, self-injurious behavior.     What you need to do:  1. Call your care team and request a same-day appointment. If they are not available (weekends or after hours) call your local crisis line, emergency room or 911.            Self-Care Plan / Wellness Kit    Self-Care for Depression  Heres the deal. Your body and mind are really not as separate as most people think.  What you do and think affects how you feel and how you feel influences what you do and think. This means if you do things that people who feel good do, it will help you feel better.  Sometimes this is all it takes.  There is also a place for medication and therapy depending on how severe your depression is, so be sure to consult with your medical provider and/ or Behavioral Health Consultant if your symptoms are worsening or not improving.     In order to better manage my stress, I will:    Exercise  Get some form of exercise, every day. This will help reduce pain and release endorphins, the feel good chemicals in your brain. This is almost as good as taking antidepressants!  This is not the same as joining a gym and then never going! (they count on that by the way?) It can be as simple as just going for a walk or doing some gardening, anything that will get you moving.      Hygiene   Maintain good hygiene (get out of bed in the morning, make your bed, brush your teeth, take a shower, and get dressed like you were going to work, even if you are unemployed).  If your clothes don't fit try to get ones that do.    Diet  Strive to eat foods that are good for me, drink plenty of water, and avoid excessive sugar, caffeine, alcohol, and other mood-altering substances.  Some foods that are helpful in depression are: complex carbohydrates, B vitamins, flaxseed, fish or fish oil, fresh fruits and  vegetables.    Psychotherapy  Agree to participate in Individual Therapy (if recommended).    Medication  If prescribed medications, I agree to take them.  Missing doses can result in serious side effects.  I understand that drinking alcohol, or other illicit drug use, may cause potential side effects.  I will not stop my medication abruptly without first discussing it with my provider.    Staying Connected With Others  Stay in touch with my friends, family members, and my primary care provider/team.    Use your imagination  Be creative.  We all have a creative side; it doesnt matter if its oil painting, sand castles, or mud pies! This will also kick up the endorphins.    Witness Beauty  (AKA stop and smell the roses) Take a look outside, even in mid-winter. Notice colors, textures. Watch the squirrels and birds.     Service to others  Be of service to others.  There is always someone else in need.  By helping others we can get out of ourselves and remember the really important things.  This also provides opportunities for practicing all the other parts of the program.    Humor  Laugh and be silly!  Adjust your TV habits for less news and crime-drama and more comedy.    Control your stress  Try breathing deep, massage therapy, biofeedback, and meditation. Find time to relax each day.     Crisis Text Line  http://www.crisistextline.org    The Crisis Text Line serves anyone, in any type of crisis, providing access to free, 24/7 support and information via the medium people already use and trust:    Here's how it works:  1.  Text 184-151 from anywhere in the [place], anytime, about any type of crisis.  2.  A live, trained Crisis Counselor receives the text and responds quickly.  3.  The volunteer Crisis Counselor will help you move from a 'hot moment to a cool moment'.  My support system    Clinic Contact:  Phone number:    Contact 1:  Phone number:    Contact 2:  Phone number:    Yarsani/:  Phone  number:    Therapist:  Phone number:    [facility] crisis center:    Phone number:    Other community support:  Phone number:

## 2021-06-22 NOTE — PATIENT INSTRUCTIONS - HE
Recommendations from today's visit                                                       1. Check with your insurance on coverage for Shingrix.  If this is covered, you may return for a nurse-only visit.      2. Left knee:  Normal exam today.  If this becomes more uncomfortable, please let me know and we would do some imaging at that point.      3. Throat: Continue to monitor. If still present after 2-3 months I would refer you to Ear, Nose and Throat to do an in-office scope    Next appointment: annual physical     To reschedule your appointment, please call the clinic directly at [phone].   It was a pleasure seeing you today! I look forward to seeing you again.

## 2021-06-22 NOTE — PROGRESS NOTES
Assessment & Plan   1. Recurrent major depressive disorder, in partial remission (H)  Depression fairly well controlled on sertraline 150-200mg.  Significant situational stressors currently with upcoming separation from her  and daughter transitioning to college.  Encouraged her to continue to work with her therapist. She will return if depression symptoms worsen.  Medication refilled for one year.    - sertraline (ZOLOFT) 100 MG tablet; TAKE 2 TABLETS(200 MG) BY MOUTH DAILY.  Dispense: 180 tablet; Refill: 3    2. Dupuytren's contracture of left hand  Discussed diagnosis and potential for progression with age, affecting the fingers.  Currently she is not particularly bothered by this so will continue to monitor.     3. Biceps muscle strain, left, initial encounter  I suspect this is a biceps strain related to carrying heavy bags with the left forearm.  She will be very conscientious of this over the next couple of weeks and she does have a physical scheduled in a few weeks when we can recheck. Consider lab workup for myalgia and possible xray if no improvement.     Huong Dacosta CNP    Subjective   Chief Complaint:  Depression    HPI:   Mame Phan is a 53 y.o. female who presents for establish care and medication check.   She is a previous patient of Dr. Bates.  PMH of depression, otherwise negative.      Depression:  H/o depression for almost 20 years, first noted in postpartum period with her oldest daughter. She has been on sertraline since that time at various dosages.  She is currently taking 200mg.  Alternates between 150mg and 200mg depending upon the time of year and situation.  She describes a history of a very difficult marriage.  States she strongly believes depression during this time is directly related to her .  Put off a divorce as her daughter was going through a difficult time and wanted a stable home life for her. This past spring she decided to pursue this. Currently still  living in the same house, however she plans to move out in January.  Needing to find a job for financial independence. Big source of anxiety for her.  Working with a therapist regularly for the past few years.  Daughter with mental health concerns as well who recently transitioned to college.  Overall, she feels the sertraline works well for her.  She feels her current depression symptoms, most notably sleeping long hours, is related to her situation.      L hand:  Painful lump on left hand for many years.  Full ROM of fingers without locking.  She believes this is related to a difficult doorknob in the house which requires her to strain her hand to use.      L arm:  Fairly constant muscle ache in the left biceps area for the past few months.  She states initially this felt 'deeper, like in the bone'.  However, she realized she was carrying her purse on her forearm with her elbow flexed.  Now wondering if it is more muscular.  Mild pain on right side, predominantly left.  No discomfort in elbow or shoulder joints.     Allergies:  is allergic to monistat 1 (tioconazole) [tioconazole].    SH/FH:  Social History and Family History reviewed and updated.   Tobacco Status:  She  reports that  has never smoked. she has never used smokeless tobacco.    Review of Systems:  A complete head to toe ROS is negative unless otherwise noted in HPI    Objective   There were no vitals filed for this visit.    Physical Exam:  GENERAL: Alert, well-appearing female  PSYCH: Pleasant mood, affect appropriate.  Good judgment and insight.  Intact recent and remote memory.  Good eye contact.  MSK: Left palm with semi-firm nodule, tender to palpation, at base of left fifth finger.  Full ROM of all fingers.  strength 5/5.  Left biceps without deformity or TTP.  Full ROM of shoulder and elbow.  UE strength 5/5 bilaterally.

## 2021-06-22 NOTE — PROGRESS NOTES
FEMALE PREVENTIVE EXAM    Assessment & Plan   1. Routine general medical examination at a health care facility  Fasting labs, not due for pap    2. Chronic pain of left knee  Mild, discussed possible xray to assess for arthritis though this is not currently too bothersome for her so will hold off at this time.     3. Throat discomfort  Recent URI.  Normal exam. Continue to monitor.      4. Screening for lead exposure  - Lead, Blood    5. Encounter for hepatitis C screening test for low risk patient  - Hepatitis C Antibody (Anti-HCV)    6. Screening for cholesterol level  - Lipid Cascade    7. Screening for diabetes mellitus  - Basic Metabolic Panel    8. Screening for deficiency anemia  - HM2(CBC w/o Differential)    9. Encounter for vitamin deficiency screening  - Vitamin D, Total (25-Hydroxy)    10. Screening for thyroid disorder  - Thyroid Cascade    Recommend repeat pap smear if normal every five years, Recommended adequate calcium intake/osteoporosis prevention, Discussed breast cancer screening guidelines, Discussed colon cancer screening at age 50, 45 if -American and Discussed diet, including moderation of portions sizes, avoiding eating out and fast food and increase in fruits and vegetables    Huong Dacosta CNP    Subjective:   Chief Complaint:  Annual Exam (fasting) and Pain (left knee )    HPI:  Mame Phan is a 53 y.o. female who presents for routine physical exam.    She was seen one month ago for medication check for depression, biceps strain.  Back today for physical with fasting labs.      Left knee pain: Present intermittently.  Mostly mild.  She notices it more at the end of the day when she rests.  Seems to improve with walking.  No instability.  No prior history of injury.  She does note an incident as a child when she fell off of h horse, though injury was mostly to the left hip.    Biceps strain: Improved with not holding handbag    Sore spot in throat: Upper respiratory infection 3  weeks ago.  She describes a point of focal discomfort on the right side of her throat that has persisted since that time.  No difficulty with swallowing or globus sensation.  She is not a smoker.    Worried about lead exposure through lead pipes in home, requests check of level.     OB/Gyn History:  Menstrual history: post menopausal  Date of previous pap: 2017  History of abnormal pap: none    Preventive Health:  Reviewed and recommended screening and treatment recommendations:  Mammography: due  Colonoscopy: up to date  DEXA: 2017  Immunizations: up to date    Health Habits:    Exercise: yes, regularly.  Calcium intake/Osteoporosis prevention: yes    PMH:   Patient Active Problem List   Diagnosis     Depression     Chronic Rhinitis       Past Medical History:   Diagnosis Date     Basal cell carcinoma      Squamous cell carcinoma        Current Medications: Reviewed   Current Outpatient Medications on File Prior to Visit   Medication Sig Dispense Refill     sertraline (ZOLOFT) 100 MG tablet TAKE 2 TABLETS(200 MG) BY MOUTH DAILY. 180 tablet 3     No current facility-administered medications on file prior to visit.        Allergies:  Reviewed  is allergic to monistat 1 (tioconazole) [tioconazole].    Social History:  Social History     Occupational History     Not on file   Tobacco Use     Smoking status: Never Smoker     Smokeless tobacco: Never Used   Substance and Sexual Activity     Alcohol use: Yes     Comment: 2/ week     Drug use: No     Sexual activity: Yes     Partners: Male       Family History:   Family History   Problem Relation Age of Onset     Breast cancer Mother 53     Heart disease Mother      Hypertension Mother      Cancer Father         prostate, esophageal     Prostate cancer Father      Diabetes Father      Dementia Father      No Medical Problems Sister      No Medical Problems Daughter      Cancer Maternal Grandmother         stomach     No Medical Problems Maternal Grandfather      Cancer  "Paternal Grandmother      Diabetes Paternal Grandfather      Cancer Maternal Aunt         lung     No Medical Problems Paternal Aunt      Cancer Cousin      Down syndrome Brother      Cancer Maternal Uncle         testicular     Depression Sister      Kidney cancer Sister      Alcohol abuse Sister      Diabetes Brother      BRCA 1/2 Neg Hx      Colon cancer Neg Hx      Endometrial cancer Neg Hx      Ovarian cancer Neg Hx          Review of Systems:  Complete head to toe review of systems is otherwise negative except as above.    Objective:    BP 90/68 (Patient Site: Left Arm, Patient Position: Sitting, Cuff Size: Adult Regular)   Pulse 61   Ht 5' 6\" (1.676 m)   Wt 129 lb (58.5 kg)   SpO2 99%   BMI 20.82 kg/m      GENERAL: Alert, well-appearing female .   PSYCH: Pleasant mood, affect appropriate.    SKIN: No atypical lesions  EYES: Conjunctiva pink, sclera white, no exudates. SUKHDEEP.  EOMs intact.  EARS: TMs pearly grey, no bulging, redness, retraction.   MOUTH: Pharynx moist, pink without exudate. No tonsillar enlargement  NECK: No lymphadenopathy. Thyroid borders smooth without enlargement, nodules.   CV: Regular rate and rhythm without murmurs, rubs or gallops.  RESP: Lung sounds clear  ABDOMEN: BS+. Abdomen soft.  No organomegaly  BREASTS: Breasts symmetric, no dimpling, masses or skin discolorations seen. Areolas and nipples symmetric without discharge. On palpation, breast tissue supple and nontender. No masses or nodules. Axillary and epitrochlear lymph nodes nonpalpable.   PV :  No edema  MSK:  Knees in alignment. No TTP.  Negative Lachman, posterior drawer, Janes.  No pain with varus or valgus stress.        "

## 2021-06-23 NOTE — TELEPHONE ENCOUNTER
LM for patient to call back for results. If patient calls back ok to relay message.Will try contacting patient again later this evening.     PAUL/JUAN MANUEL

## 2021-06-23 NOTE — TELEPHONE ENCOUNTER
Patient Returning Call  Reason for call: LMTCB  Information relayed to patient: Relayed Msg and patient agrees. Patient understands the need to increase Vitaman D supplement and will follow up as needed . Please Mail Results to address as listed.   Patient has additional questions:  No  If YES, what are your questions/concerns:  NA  Okay to leave a detailed message?: No call back needed

## 2021-06-23 NOTE — TELEPHONE ENCOUNTER
Patient was informed of test results. Have mailed results letter per patients request.     GJ/CMA

## 2021-06-23 NOTE — TELEPHONE ENCOUNTER
----- Message from Huong Dacosta CNP sent at 1/12/2019  1:51 PM CST -----  Mame,    All of your labs were normal except for your vitamin D which was low.  I recommend either starting a supplement of 2000IU daily or increasing your current supplementation by 2000IU.

## 2021-07-21 ENCOUNTER — RECORDS - HEALTHEAST (OUTPATIENT)
Dept: ADMINISTRATIVE | Facility: CLINIC | Age: 56
End: 2021-07-21

## 2021-08-09 ENCOUNTER — TELEPHONE (OUTPATIENT)
Dept: FAMILY MEDICINE | Facility: CLINIC | Age: 56
End: 2021-08-09

## 2021-08-09 ENCOUNTER — ALLIED HEALTH/NURSE VISIT (OUTPATIENT)
Dept: FAMILY MEDICINE | Facility: CLINIC | Age: 56
End: 2021-08-09
Payer: COMMERCIAL

## 2021-08-09 DIAGNOSIS — Z23 NEED FOR SHINGLES VACCINE: ICD-10-CM

## 2021-08-09 DIAGNOSIS — Z23 NEED FOR SHINGLES VACCINE: Primary | ICD-10-CM

## 2021-08-09 PROCEDURE — 90750 HZV VACC RECOMBINANT IM: CPT | Performed by: NURSE PRACTITIONER

## 2021-08-09 PROCEDURE — 99207 PR NO CHARGE NURSE ONLY: CPT | Performed by: NURSE PRACTITIONER

## 2021-08-09 PROCEDURE — 90471 IMMUNIZATION ADMIN: CPT | Performed by: NURSE PRACTITIONER

## 2021-09-07 DIAGNOSIS — F33.41 RECURRENT MAJOR DEPRESSIVE DISORDER, IN PARTIAL REMISSION (H): ICD-10-CM

## 2021-09-08 RX ORDER — SERTRALINE HYDROCHLORIDE 100 MG/1
TABLET, FILM COATED ORAL
Qty: 180 TABLET | Refills: 0 | Status: SHIPPED | OUTPATIENT
Start: 2021-09-08

## 2021-09-08 NOTE — TELEPHONE ENCOUNTER
"Routing refill request to provider for review/approval because:  PHQ 9 score    Last Written Prescription Date:  6/13/21  Last Fill Quantity: 180,  # refills: 0   Last office visit provider:  9/24/20     Requested Prescriptions   Pending Prescriptions Disp Refills     sertraline (ZOLOFT) 100 MG tablet [Pharmacy Med Name: SERTRALINE 100MG TABLETS] 180 tablet 0     Sig: TAKE 2 TABLETS BY MOUTH DAILY       SSRIs Protocol Failed - 9/7/2021 10:16 PM        Failed - PHQ-9 score less than 5 in past 6 months     Please review last PHQ-9 score.           Failed - Recent (6 mo) or future (30 days) visit within the authorizing provider's specialty     Patient had office visit in the last 6 months or has a visit in the next 30 days with authorizing provider or within the authorizing provider's specialty.  See \"Patient Info\" tab in inbasket, or \"Choose Columns\" in Meds & Orders section of the refill encounter.            Passed - Medication is active on med list        Passed - Patient is age 18 or older        Passed - No active pregnancy on record        Passed - No positive pregnancy test in last 12 months             Leo Roberson RN 09/08/21 1:10 PM  "

## 2021-11-30 ENCOUNTER — TRANSFERRED RECORDS (OUTPATIENT)
Dept: HEALTH INFORMATION MANAGEMENT | Facility: CLINIC | Age: 56
End: 2021-11-30
Payer: COMMERCIAL

## 2024-03-20 ENCOUNTER — ANCILLARY PROCEDURE (OUTPATIENT)
Dept: MAMMOGRAPHY | Facility: CLINIC | Age: 59
End: 2024-03-20
Payer: COMMERCIAL

## 2024-03-20 DIAGNOSIS — Z12.31 VISIT FOR SCREENING MAMMOGRAM: ICD-10-CM

## 2024-03-20 PROCEDURE — 77063 BREAST TOMOSYNTHESIS BI: CPT | Mod: TC | Performed by: RADIOLOGY

## 2024-03-20 PROCEDURE — 77067 SCR MAMMO BI INCL CAD: CPT | Mod: TC | Performed by: RADIOLOGY
